# Patient Record
Sex: MALE | Race: WHITE
[De-identification: names, ages, dates, MRNs, and addresses within clinical notes are randomized per-mention and may not be internally consistent; named-entity substitution may affect disease eponyms.]

---

## 2021-01-12 ENCOUNTER — HOSPITAL ENCOUNTER (EMERGENCY)
Dept: HOSPITAL 75 - ER FS | Age: 36
Discharge: HOME | End: 2021-01-12
Payer: COMMERCIAL

## 2021-01-12 VITALS — DIASTOLIC BLOOD PRESSURE: 72 MMHG | SYSTOLIC BLOOD PRESSURE: 118 MMHG

## 2021-01-12 VITALS — BODY MASS INDEX: 32.1 KG/M2 | HEIGHT: 70.87 IN | WEIGHT: 229.28 LBS

## 2021-01-12 DIAGNOSIS — U07.1: Primary | ICD-10-CM

## 2021-01-12 LAB
ALBUMIN SERPL-MCNC: 4.4 GM/DL (ref 3.2–4.5)
ALP SERPL-CCNC: 136 U/L (ref 40–136)
ALT SERPL-CCNC: 28 U/L (ref 0–55)
BASOPHILS # BLD AUTO: 0 10^3/UL (ref 0–0.1)
BASOPHILS NFR BLD AUTO: 0 % (ref 0–10)
BILIRUB SERPL-MCNC: 0.2 MG/DL (ref 0.1–1)
BUN/CREAT SERPL: 13
CALCIUM SERPL-MCNC: 10.3 MG/DL (ref 8.5–10.1)
CHLORIDE SERPL-SCNC: 107 MMOL/L (ref 98–107)
CO2 SERPL-SCNC: 24 MMOL/L (ref 21–32)
CREAT SERPL-MCNC: 0.93 MG/DL (ref 0.6–1.3)
EOSINOPHIL # BLD AUTO: 0.5 10^3/UL (ref 0–0.3)
EOSINOPHIL NFR BLD AUTO: 5 % (ref 0–10)
GFR SERPLBLD BASED ON 1.73 SQ M-ARVRAT: > 60 ML/MIN
GLUCOSE SERPL-MCNC: 103 MG/DL (ref 70–105)
HCT VFR BLD CALC: 46 % (ref 40–54)
HGB BLD-MCNC: 16.3 G/DL (ref 13.3–17.7)
LYMPHOCYTES # BLD AUTO: 2.3 X 10^3 (ref 1–4)
LYMPHOCYTES NFR BLD AUTO: 26 % (ref 12–44)
MANUAL DIFFERENTIAL PERFORMED BLD QL: NO
MCH RBC QN AUTO: 30 PG (ref 25–34)
MCHC RBC AUTO-ENTMCNC: 35 G/DL (ref 32–36)
MCV RBC AUTO: 87 FL (ref 80–99)
MONOCYTES # BLD AUTO: 0.9 X 10^3 (ref 0–1)
MONOCYTES NFR BLD AUTO: 10 % (ref 0–12)
NEUTROPHILS # BLD AUTO: 5.1 X 10^3 (ref 1.8–7.8)
NEUTROPHILS NFR BLD AUTO: 58 % (ref 42–75)
PLATELET # BLD: 251 10^3/UL (ref 130–400)
PMV BLD AUTO: 9.9 FL (ref 7.4–10.4)
POTASSIUM SERPL-SCNC: 4.3 MMOL/L (ref 3.6–5)
PROT SERPL-MCNC: 7.6 GM/DL (ref 6.4–8.2)
SODIUM SERPL-SCNC: 142 MMOL/L (ref 135–145)
WBC # BLD AUTO: 8.8 10^3/UL (ref 4.3–11)

## 2021-01-12 PROCEDURE — 71045 X-RAY EXAM CHEST 1 VIEW: CPT

## 2021-01-12 PROCEDURE — 80053 COMPREHEN METABOLIC PANEL: CPT

## 2021-01-12 PROCEDURE — 86141 C-REACTIVE PROTEIN HS: CPT

## 2021-01-12 PROCEDURE — 36415 COLL VENOUS BLD VENIPUNCTURE: CPT

## 2021-01-12 PROCEDURE — 85025 COMPLETE CBC W/AUTO DIFF WBC: CPT

## 2021-01-12 NOTE — ED RESPIRATORY
General


Chief Complaint:  Respiratory Problems


Stated Complaint:  COVID-19 POSITIVE,SOA,DIZZY


Nursing Triage Note:  


Patient reports he began having symptoms of cough, sore throat, and shortness of




breath on , states he tested positive for COVID-19 yesterday. He reports 


he also tested positive for strep throat yesterday and is taking antibiotics. He




states he went to walk-in care today and was referred to the ED for increased 


shortness of breath.





History of Present Illness


Date Seen by Provider:  2021


Time Seen by Provider:  17:35


Initial Comments


35-year-old male who presents with cough, sore throat or shortness of breath. 

Patient reports that symptoms started 3 days ago. That he was seen yesterday and

was tested positive for strep and COVID. He was started on amoxicillin. He 

states that he went to walk and care today and was referred to the ER because he

feels like he is having some increased shortness of breath. At the walk-in 

clinic his oxygen saturation was 98-99%. He complains of chest wall pain on the 

right side with deep breath. He has a subjective fever and chills. Patient 

reports that he feels short of breath. Patient denies nausea, vomiting, 

diarrhea, abdominal pain or urinary symptoms.





Allergies and Home Medications


Allergies


Coded Allergies:  


     No Known Drug Allergies (Unverified , 21)





Patient Home Medication List


Home Medication List Reviewed:  Yes





Review of Systems


Review of Systems


Constitutional:  chills, dizziness, fever


EENTM:  throat pain


Respiratory:  cough, short of breath


Cardiovascular:  chest pain (with cough)


Gastrointestinal:  No abdominal pain, No nausea, No vomiting


Musculoskeletal:  no symptoms reported


Skin:  no symptoms reported


Psychiatric/Neurological:  No Symptoms Reported


Hematologic/Lymphatic:  No Symptoms Reported


Immunological/Allergic:  no symptoms reported





Past Medical-Social-Family Hx


Past Med/Social Hx:  Reviewed Nursing Past Med/Soc Hx


Patient Social History


Recent Infectious Disease Expo:  Yes





Physical Exam





Vital Signs - First Documented








 21





 17:31


 


Temp 36.8


 


Pulse 95


 


Resp 16


 


B/P (MAP) 120/82 (95)


 


Pulse Ox 97


 


O2 Delivery Room Air





Capillary Refill : Less Than 3 Seconds


Height: '"


Weight: lbs. oz. kg; 32.00 BMI


Method:


General Appearance:  other (not feeling well, ill)


Eyes:  Bilateral Eye Normal Inspection


Neck:  full range of motion, supple, lymphadenopathy (R), lymphadenopathy (L)


Respiratory:  lungs clear, normal breath sounds, no respiratory distress, no 

accessory muscle use


Cardiovascular:  normal peripheral pulses, regular rate, rhythm


Gastrointestinal:  non tender, soft


Extremities:  normal range of motion


Neurologic/Psychiatric:  alert, normal mood/affect, oriented x 3


Skin:  normal color, warm/dry





Progress/Results/Core Measures


Suspected Sepsis


Recent Fever Within 48 Hours:  No


Infection Criteria Present:  Documented Infection


New/Unexplained  Altered Menta:  No


Sepsis Screen:  No Definite Risk


SIRS


Temperature: 


Pulse: 95 


Respiratory Rate: 16


 


Laboratory Tests


21 17:45: White Blood Count 8.8


Blood Pressure 120 /82 


Mean: 95


 











Laboratory Tests


21 17:45: 


Creatinine 0.93, Platelet Count 251, Total Bilirubin 0.2





Results/Orders


Lab Results





Laboratory Tests








Test


 21


17:45 Range/Units


 


 


White Blood Count


 8.8 


 4.3-11.0


10^3/uL


 


Red Blood Count


 5.36 


 4.35-5.85


10^6/uL


 


Hemoglobin 16.3  13.3-17.7  G/DL


 


Hematocrit 46  40-54  %


 


Mean Corpuscular Volume 87  80-99  FL


 


Mean Corpuscular Hemoglobin 30  25-34  PG


 


Mean Corpuscular Hemoglobin


Concent 35 


 32-36  G/DL





 


Red Cell Distribution Width 12.6  10.0-14.5  %


 


Platelet Count


 251 


 130-400


10^3/uL


 


Mean Platelet Volume 9.9  7.4-10.4  FL


 


Immature Granulocyte % (Auto) 1   %


 


Neutrophils (%) (Auto) 58  42-75  %


 


Lymphocytes (%) (Auto) 26  12-44  %


 


Monocytes (%) (Auto) 10  0-12  %


 


Eosinophils (%) (Auto) 5  0-10  %


 


Basophils (%) (Auto) 0  0-10  %


 


Neutrophils # (Auto) 5.1  1.8-7.8  X 10^3


 


Lymphocytes # (Auto) 2.3  1.0-4.0  X 10^3


 


Monocytes # (Auto) 0.9  0.0-1.0  X 10^3


 


Eosinophils # (Auto)


 0.5 H


 0.0-0.3


10^3/uL


 


Basophils # (Auto)


 0.0 


 0.0-0.1


10^3/uL


 


Immature Granulocyte # (Auto)


 0.0 


 0.0-0.1


10^3/uL


 


Sodium Level 142  135-145  MMOL/L


 


Potassium Level 4.3  3.6-5.0  MMOL/L


 


Chloride Level 107    MMOL/L


 


Carbon Dioxide Level 24  21-32  MMOL/L


 


Anion Gap 11  5-14  MMOL/L


 


Blood Urea Nitrogen 12  7-18  MG/DL


 


Creatinine


 0.93 


 0.60-1.30


MG/DL


 


Estimat Glomerular Filtration


Rate > 60 


  





 


BUN/Creatinine Ratio 13   


 


Glucose Level 103    MG/DL


 


Calcium Level 10.3 H 8.5-10.1  MG/DL


 


Corrected Calcium 10.0  8.5-10.1  MG/DL


 


Total Bilirubin 0.2  0.1-1.0  MG/DL


 


Aspartate Amino Transf


(AST/SGOT) 21 


 5-34  U/L





 


Alanine Aminotransferase


(ALT/SGPT) 28 


 0-55  U/L





 


Alkaline Phosphatase 136    U/L


 


C-Reactive Protein 2.07 H <0.50  MG/DL


 


Total Protein 7.6  6.4-8.2  GM/DL


 


Albumin 4.4  3.2-4.5  GM/DL








My Orders





Orders - WANG,ELDER L DO


Chest 1 View Ap/Pa Only (21 17:34)


Cbc With Automated Diff (21 17:34)


Comprehensive Metabolic Panel (21 17:34)


Crp Fs (21 17:34)


Ketorolac Injection (Toradol Injection) (21 17:34)


Dexamethasone Injection (Decadron Inje (21 17:45)





Medications Given in ED





Current Medications








 Medications  Dose


 Ordered  Sig/Donna


 Route  Start Time


 Stop Time Status Last Admin


Dose Admin


 


 Dexamethasone


 Sodium Phosphate  10 mg  ONCE  ONCE


 IV  21 17:45


 21 17:46 DC 21 17:44


10 MG








Vital Signs/I&O











 21





 17:31


 


Temp 36.8


 


Pulse 95


 


Resp 16


 


B/P (MAP) 120/82 (95)


 


Pulse Ox 97


 


O2 Delivery Room Air





Capillary Refill : Less Than 3 Seconds








Blood Pressure Mean:                    95








Progress Note :  


   Time:  18:28


Progress Note


Patient with negative chest x-ray and labs. Patient's oxygen saturation remained

in the upper 90s throughout his stay with no signs of respiratory distress. 

Patient was given some Toradol for his pleuritic chest pain along with some 

Decadron for his strep throat. Patient stable and be discharged home





Diagnostic Imaging





   Diagonstic Imaging:  Xray


   Plain Films/CT/US/NM/MRI:  chest


Comments


                 [facility].


                                [place], [place]





NAME:   SATISH ALBA


[facility] REC#:   [id number]


ACCOUNT#:   [id number]


PT STATUS:   REG ER


:   1985


PHYSICIAN:   ELDER WANG DO


ADMIT DATE:   21/ER FS


                                   ***Draft***


Date of Exam:21





CHEST 1 VIEW AP/PA ONLY








INDICATION:  Shortness breath, chest wall pain.  





TECHNIQUE:  Single view chest 5:43 PM.





CORRELATION STUDY:  None





FINDINGS: 


The heart size, mediastinal configuration and pulmonary


vascularity are within normal limits.  


The lungs are clear with no consolidating infiltrate. There is no


significant effusion or pneumothorax. 





IMPRESSION: 


1. Negative for acute abnormality of the chest.





  Dictated on workstation # [id number]








Dict:   21 1759


Trans:   21 1800


DO [id number]





Departure


Impression





   Primary Impression:  


   Strep throat


   Additional Impression:  


   COVID-19


Disposition:  01 HOME, SELF-CARE


Condition:  Stable





Departure-Patient Inst.


Patient Instructions:  Coronavirus Disease 2019 (COVID-19) Overview, Strep 

Throat (DC)





Add. Discharge Instructions:  


Vitamin C 500 mg twice daily, zinc 100 mg daily, melatonin slow release, 6-12 mg

at night, vitamin D 4000 units daily, 81 mg aspirin daily, Pepcid 40 mg twice 

daily. These medications should help with your COVID symptoms and progression.


Please follow-up with her primary care provider as needed,  return to the ER if 

shortness of breath continues to worsen and year pulse ox drops below 90%








All discharge instructions reviewed with patient and/or family. Voiced und

erstanding.


Scripts


Ivermectin (Ivermectin) 3 Mg Tablet


21 MG PO DAILY, #14 TAB


   Take 21 mg today then another 21 mg 2 days later


   Prov: ELDER WANG DO         21











ELDER WANG DO               2021 17:50

## 2021-01-12 NOTE — DIAGNOSTIC IMAGING REPORT
INDICATION:  Shortness breath, chest wall pain.  



TECHNIQUE:  Single view chest 5:43 PM.



CORRELATION STUDY:  None



FINDINGS: 

The heart size, mediastinal configuration and pulmonary

vascularity are within normal limits.  

The lungs are clear with no consolidating infiltrate. There is no

significant effusion or pneumothorax. 



IMPRESSION: 

1. Negative for acute abnormality of the chest.



Dictated by: 



  Dictated on workstation # [id number]

## 2021-10-22 ENCOUNTER — HOSPITAL ENCOUNTER (EMERGENCY)
Dept: HOSPITAL 75 - ER FS | Age: 36
Discharge: HOME | End: 2021-10-22
Payer: SELF-PAY

## 2021-10-22 VITALS — WEIGHT: 255.74 LBS | BODY MASS INDEX: 35.8 KG/M2 | HEIGHT: 70.98 IN

## 2021-10-22 VITALS — SYSTOLIC BLOOD PRESSURE: 133 MMHG | DIASTOLIC BLOOD PRESSURE: 74 MMHG
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
## 2021-10-22 DIAGNOSIS — S33.5XXA: Primary | ICD-10-CM

## 2021-10-22 PROCEDURE — 96372 THER/PROPH/DIAG INJ SC/IM: CPT

## 2021-10-22 PROCEDURE — 99284 EMERGENCY DEPT VISIT MOD MDM: CPT

## 2021-10-22 NOTE — ED GENERAL
General


Stated Complaint:  NECK/SHOULDER/BACK PAIN


Source of Information:  Patient





History of Present Illness


Date Seen by Provider:  Oct 22, 2021


Time Seen by Provider:  06:45


Initial Comments


Patient is a 36-year-old male presents with right lower paravertebral back pain 

after moving heavy appliances yesterday.  Pain is moderate to severe sharp worse

with palpation movement and trunk rotation.  Is nonradiating.  He denies midline

pain and extremity weakness.  Took NSAIDs and Flexeril today limited relief.  No

other acute symptoms or complaints.


Timing/Duration:  1/2 Hour


Severity:  Moderate


Modifying Factors:  improves with Other


Associated Systoms:  Other





Allergies and Home Medications


Allergies


Coded Allergies:  


     No Known Drug Allergies (Unverified , 1/12/21)





Patient Home Medication List


Home Medication List Reviewed:  Yes


Ivermectin (Ivermectin) 3 Mg Tablet, 21 MG PO DAILY


   Prescribed by: ELDER WANG on 1/12/21 1832





Review of Systems


Review of Systems


Constitutional:  see HPI


EENTM:  see HPI


Respiratory:  see HPI


Cardiovascular:  see HPI


Gastrointestinal:  see HPI


Genitourinary:  see HPI


Skin:  see HPI


Psychiatric/Neurological:  See HPI


Hematologic/Lymphatic:  See HPI


Immunological/Allergic:  see HPI





All Other Systems Reviewed


Negative Unless Noted:  Yes





Past Medical-Social-Family Hx


Seasonal Allergies


Seasonal Allergies:  No





Past Medical History


Surgeries:  No


Respiratory:  No


Cardiac:  No


Neurological:  No


Genitourinary:  No


Gastrointestinal:  No


Musculoskeletal:  No


Endocrine:  No


HEENT:  No


Cancer:  No


Psychosocial:  No


Integumentary:  No





Physical Exam


Vital Signs


Capillary Refill :


Height, Weight, BMI


Height: '"


Weight: lbs. oz. kg; 32.00 BMI


Method:


General Appearance:  Moderate Distress (Secondary to pain)


Eyes:  Bilateral Eye Normal Inspection, Bilateral Eye PERRL, Bilateral Eye EOMI


HEENT:  PERRL/EOMI


Neck:  Full Range of Motion


Respiratory:  Chest Non Tender, Lungs Clear


Back:  Muscle Spasm (Right lumbar paravertebral pain/tenderness.); No Vertebral 

Tenderness


Neurologic/Psychiatric:  Alert, Oriented x3, Normal Mood/Affect





Focused Exam


Sepsis Stage:  Ruled Out





Progress/Results/Core Measures


Suspected Sepsis


SIRS


Temperature: 


Pulse:  


Respiratory Rate: 


 


Blood Pressure  / 


Mean:





Results/Orders


My Orders





Orders - CRISSY CLEMENTE DO


Morphine  Injection (Morphine  Injection (10/22/21 18:56)


Ondansetron  Oral Dissolve Tab (Zofran (10/22/21 18:56)





Vital Signs/I&O


Capillary Refill :





Departure


Communication (Admissions)


Acute lumbar back sprain.  No neurologic deficit.  Pain addressed.  

Recommendations are for supportive care and PCP follow-up as needed.





Impression





   Primary Impression:  


   Sprain lumbar region


Disposition:  01 HOME, SELF-CARE


Condition:  Stable





Departure-Patient Inst.


Referrals:  


NO,LOCAL PHYSICIAN (PCP/Family)


Primary Care Physician


Patient Instructions:  Back Muscle Strain





Add. Discharge Instructions:  


You were evaluated in the emergency department for low back pain.  Please avoid 

strenuous physical activity heavy lifting and take ibuprofen and Flexeril for 

pain.  Take hydrocodone as needed for additional relief.  Follow-up with your 

PCP early next week if symptoms persist.


Scripts


Hydrocodone/Acetaminophen (Hydrocodone-Acetamin 7.5-325) 1 Each Tablet


1 EACH PO Q6H, #10 TAB


   Prov: CRISSY CLEMENTE DO         10/22/21











CRISSY CLEMENTE DO                   Oct 22, 2021 19:04

## 2021-11-15 ENCOUNTER — HOSPITAL ENCOUNTER (EMERGENCY)
Dept: HOSPITAL 75 - ER FS | Age: 36
Discharge: HOME | End: 2021-11-15
Payer: SELF-PAY

## 2021-11-15 VITALS — WEIGHT: 253.53 LBS | HEIGHT: 70.87 IN | BODY MASS INDEX: 35.49 KG/M2

## 2021-11-15 VITALS — DIASTOLIC BLOOD PRESSURE: 76 MMHG | SYSTOLIC BLOOD PRESSURE: 118 MMHG

## 2021-11-15 DIAGNOSIS — M54.16: Primary | ICD-10-CM

## 2021-11-15 PROCEDURE — 99284 EMERGENCY DEPT VISIT MOD MDM: CPT

## 2021-11-15 NOTE — ED BACK PAIN
General


Chief Complaint:  Back Problems


Stated Complaint:  RT SIDE BACK PAIN


Source of Information:  Patient


Exam Limitations:  No Limitations





History of Present Illness


Date Seen by Provider:  Nov 15, 2021


Time Seen by Provider:  07:05


Initial Comments


36yoM with no significant PMH coming in due to R lower back pain. Originally had

this pain in October when he came to our ER. Pain got better with time. Started 

back on Thursday and has been constant aching in his lower back radiating down 

his right leg stopping at his knee.  Denies any weakness, numbness, bowel or 

bladder issues, fever, IV drug use, history of cancer, trauma, burning with 

urination, hematuria, history of kidney stones, or any midline lower back pain. 

Has been trying intermittent ibuprofen and Tylenol with the last doses yesterday

which took the edge off.  Does not have insurance and has been unable to follow-

up with orthopedics.  Is otherwise denying any other acute complaints.





Allergies and Home Medications


Allergies


Coded Allergies:  


     No Known Drug Allergies (Unverified , 1/12/21)





Patient Home Medication List


Home Medication List Reviewed:  Yes


Hydrocodone/Acetaminophen (Hydrocodone-Acetamin 7.5-325) 1 Each Tablet, 1 EACH 

PO Q6H


   Prescribed by: CRISSY CLEMENTE on 10/22/21 1905


Ivermectin (Ivermectin) 3 Mg Tablet, 21 MG PO DAILY


   Prescribed by: ELDER WANG on 1/12/21 1832





Review of Systems


Constitutional:  No chills, No fever


EENTM:  No blurred vision


Respiratory:  No cough


Cardiovascular:  No chest pain


Gastrointestinal:  No abdominal pain


Genitourinary:  no symptoms reported


Musculoskeletal:  back pain


Skin:  no symptoms reported


Psychiatric/Neurological:  No Symptoms Reported





All Other Systems Reviewed


Negative Unless Noted:  Yes





Past Medical-Social-Family Hx


Patient Social History


Tobacco Use?:  No


Substance use?:  No


Alcohol Use?:  No





Seasonal Allergies


Seasonal Allergies:  No





Past Medical History


Surgeries:  Yes


Orthopedic (rotator cuff surgery on right shoulder)


Respiratory:  No


Cardiac:  No


Neurological:  No


Genitourinary:  No


Gastrointestinal:  No


Musculoskeletal:  No


Endocrine:  No


HEENT:  No


Cancer:  No


Psychosocial:  No


Integumentary:  No





Physical Exam


Vital Signs


Capillary Refill :


Height, Weight, BMI


Height: '"


Weight: lbs. oz. kg; 35.00 BMI


Method:


General Appearance:  No Apparent Distress, WD/WN


HEENT:  PERRL/EOMI, Normal ENT Inspection, Pharynx Normal


Neck:  Full Range of Motion, Normal Inspection, Non Tender, Supple


Cardiovascular:  Regular Rate, Rhythm, No Edema, Normal Peripheral Pulses


Respiratory:  Chest Non Tender, Lungs Clear, Normal Breath Sounds, No Accessory 

Muscle Use, No Respiratory Distress


Gastrointestinal:  Normal Bowel Sounds, Non Tender, Soft; No Distended, No 

Guarding


Back:  Normal Inspection, No CVA Tenderness, No Vertebral Tenderness, Other 

(right sided paravertebral tenderness, positive straight leg raise and negative 

contralateral straight leg raise)


Extremity:  Normal Capillary Refill, Normal Inspection, Normal Range of Motion, 

Non Tender, No Calf Tenderness


Neurologic/Psychiatric:  Alert, Oriented x3, Normal Mood/Affect, Other (antalgic

gait, normal dorsiflexion/plantarflexion/knee extension/flexion)


Skin:  Normal Color, Warm/Dry


Lymphatic:  No Adenopathy





Progress/Results/Core Measures


Results/Orders


My Orders





Orders - YOVANA BLANCA MD


Ketorolac Injection (Toradol Injection) (11/15/21 07:30)


Dexamethasone Injection (Decadron Injec (11/15/21 07:30)


Cyclobenzaprine Tablet (Flexeril Tablet) (11/15/21 07:18)








Progress


Progress Note :  


Progress Note


36-year-old male with above history coming in due to right lower back pain.  

Pain was slow in onset and constant since Thursday.  Feels similar to when he 

tweaked his back over a month ago lifting something heavy.  Now having more 

radicular symptoms down his right leg.  Physical exam consistent with a 

musculoskeletal cause of his back pain.  He has no red flags for his low back 

pain.  Does not sound like a kidney stone based on history and exam.  Given a 

shot of Toradol and Decadron given the radicular symptoms.  We will give 

prescription for symptomatic treatment and recommended at home physical therapy 

and went over the exercises and stretches with him.  He has insurance coming in 

2 weeks, and I recommended at that time he follow-up with orthopedics if he is 

still having pain.  He was then discharged home in stable condition with strict 

return precautions.





Departure


Impression





   Primary Impression:  


   Lumbar radiculopathy


Disposition:  01 HOME, SELF-CARE


Condition:  Stable





Departure-Patient Inst.


Decision time for Depature:  07:35


Referrals:  


KEI RAMIREZ





NO,LOCAL PHYSICIAN (PCP)


Primary Care Physician


Patient Instructions:  Low Back Pain  (DC)





Add. Discharge Instructions:  


You are seen in the emergency department for lower back pain.  Please take 

ibuprofen 600 mg every 6 hours with plenty of water for the next several days.  

You can take 1000 mg of Tylenol every 8 hours as well in between these doses.  I

also wrote a prescription for a lidocaine patch which you should put over the 

part that hurts.  Sometimes this prescription is a little expensive, and you can

buy essentially the same thing over-the-counter, just asked the pharmacist where

it is at. It will be much cheaper this way.  Also wrote a prescription for a 

muscle relaxer.  Continue to do stretches of your hamstrings as well as work on 

core work such as ab strengthening with planks.  Please follow-up with an 

orthopedic doctor in the next couple weeks if you are continuing to have pain.  

If you have any true weakness we cannot move your leg, numbness or you cannot 

feel your leg, or difficulty going to the bathroom or you cannot feel yourself 

go to the bathroom, then please come back to the ER.


Scripts


Lidocaine (Lidocaine 5% Patch) 1 Each Adh..patch


1 EACH TP Q12H PRN for Neuropathic pain MDD 2 for 7 Days, #14 PATCH


   2 patches max for 12 hours, then 12 hours patch-free period.


   Prov: YOVANA BLANCA MD         11/15/21 


Cyclobenzaprine HCl (Cyclobenzaprine HCl) 10 Mg Tablet


10 MG PO Q8H PRN for SPASMS for 5 Days, #15 TAB 0 Refills


   Prov: YOVANA BLANCA MD         11/15/21


Work/School Note:  Work Release Form   Date Seen in the Emergency Department:  

Nov 15, 2021


   Return to Work:  Nov 17, 2021


   Restrictions:  No Restrictions











YOVANA BLANCA MD          Nov 15, 2021 07:25